# Patient Record
Sex: MALE | Race: WHITE | Employment: FULL TIME | URBAN - METROPOLITAN AREA
[De-identification: names, ages, dates, MRNs, and addresses within clinical notes are randomized per-mention and may not be internally consistent; named-entity substitution may affect disease eponyms.]

---

## 2019-06-01 ENCOUNTER — HOSPITAL ENCOUNTER (OUTPATIENT)
Age: 29
Discharge: HOME | End: 2019-06-01
Payer: COMMERCIAL

## 2019-06-01 VITALS
DIASTOLIC BLOOD PRESSURE: 102 MMHG | OXYGEN SATURATION: 97 % | WEIGHT: 315 LBS | SYSTOLIC BLOOD PRESSURE: 176 MMHG | TEMPERATURE: 98 F | RESPIRATION RATE: 17 BRPM | HEART RATE: 101 BPM

## 2019-06-01 DIAGNOSIS — K52.9 GASTROENTERITIS: Primary | ICD-10-CM

## 2019-06-01 PROCEDURE — 99201 HC OUTPT EVAL AND MGNT NEW PT LEVEL 1: CPT

## 2019-06-01 PROCEDURE — 99201 PR OUTPT EVAL AND MGNT NEW PT LEVEL 1: CPT

## 2019-06-01 NOTE — ED PROVIDER NOTES
Patient Seen in: [id number] [facility]    History   Patient presents with: Other    Stated Complaint: Needs note to return to work tomorrow after illness    67-year-old male who presents to the emergency room for a release to go back to work. %   O2 Device None (Room air)       Current:BP (!) 176/102   Pulse 101   Temp 97.8 °F (36.6 °C) (Temporal)   Resp 17   Wt (!) 142.9 kg   SpO2 97%         Physical Exam  GENERAL: The patient is well-developed well-nourished, nontoxic, non-ill-appearing  MARTI diagnosis)    Disposition:  Discharge  6/1/2019  3:28 pm    Follow-up:  Zhen Lewis, [address] [facility]  [phone]              Medications Prescribed:  There are no discharge medications for this patient.

## 2019-06-01 NOTE — ED INITIAL ASSESSMENT (HPI)
Pt was on 5/27-5/30 Pt was off work for N/V/D. Pt needs a note to go back to work tomorrow.     Denies fevers  Last BM was 6/1 WNL

## (undated) NOTE — LETTER
Date & Time: 6/1/2019, 3:28 PM  Patient: Shirlene Ormond      To Whom It May Concern:    Tyrell Moyer was seen and treated in our department on 6/1/2019. He can return to work. Without any restrictions or limitations.     If you have any questions o